# Patient Record
Sex: MALE | Race: WHITE | URBAN - METROPOLITAN AREA
[De-identification: names, ages, dates, MRNs, and addresses within clinical notes are randomized per-mention and may not be internally consistent; named-entity substitution may affect disease eponyms.]

---

## 2017-02-08 ENCOUNTER — TRANSFERRED RECORDS (OUTPATIENT)
Dept: HEALTH INFORMATION MANAGEMENT | Facility: CLINIC | Age: 62
End: 2017-02-08

## 2017-02-13 ENCOUNTER — TRANSFERRED RECORDS (OUTPATIENT)
Dept: HEALTH INFORMATION MANAGEMENT | Facility: CLINIC | Age: 62
End: 2017-02-13

## 2017-02-21 ENCOUNTER — PRE VISIT (OUTPATIENT)
Dept: OTOLARYNGOLOGY | Facility: CLINIC | Age: 62
End: 2017-02-21

## 2017-02-21 NOTE — TELEPHONE ENCOUNTER
ENT PRE VISIT NOTE    On the phone call:    Date of Call: February 20, 2017  Date of appointment: March 09, 2017  Reason for Call (Should match scheduling appointment note): Left frontal sinusitis suspected mucocele   Who made the initial call:  (patient, Parent (Name of Parent), referral source) Patient   Who is the Referring Provider? (Name, address, phone number, location of clinic) Dr. Oscar Preston   Where have you been seen for this condition? [facility]   Where and what testing has been done including: CT Scans: Sinus 2/8/17 & CT Head 2/22/16  ENT related surgeries in the past: Yes Chronic sinusitis 4/11/16       Request medical records: Yes  From where: [facility]   What date: 2/21/17  Who did you speak to: Fax   Is the information already in the EMR? No         Have films been pushed to PAC and when? yes:  date 2/21/17 CT sinus from [facility] available in [place] .     Have slides been sent to pathology and when?      Have outside records been received? Yes

## 2017-03-09 ENCOUNTER — OFFICE VISIT (OUTPATIENT)
Dept: OTOLARYNGOLOGY | Facility: CLINIC | Age: 62
End: 2017-03-09

## 2017-03-09 VITALS — BODY MASS INDEX: 26.2 KG/M2 | WEIGHT: 183 LBS | HEIGHT: 70 IN

## 2017-03-09 DIAGNOSIS — J32.4 CHRONIC PANSINUSITIS: Primary | ICD-10-CM

## 2017-03-09 RX ORDER — FLUTICASONE PROPIONATE 50 MCG
2 SPRAY, SUSPENSION (ML) NASAL
COMMUNITY
Start: 2012-01-25

## 2017-03-09 ASSESSMENT — PAIN SCALES - GENERAL: PAINLEVEL: MODERATE PAIN (4)

## 2017-03-09 NOTE — PROGRESS NOTES
Otolaryngology Adult Consultation    Patient: Jose Antonio Dominguez   : 1955     Patient presents with:  Consult:   Chief Complaint   Patient presents with     Consult     New Left Sinus         Referring Provider: Constantine Preston   Consulting Physician:  Zenaida Mackay MD       Assessment/Plan: I reviewed Mr. Dominguez' films with him demonstrating the obstruction of the frontal recess bilaterally and some additional osteitis and mild sinus mucosal thickening.  I have recommended bilateral frontal sinusotomy and limited work on any of his other sinuses that demonstrate obstruction on his imaging.  The patient would like to proceed and will let me know when he has travel arrangements made.  We   will get him set up for preop history and physical and endoscopic revision sinus surgery with Draf III and additional minimal ethmoid and maxillary sinus work.          HPI: Jose Antonio Dominguez is a 61 year old male seen today in the Otolaryngology Clinic for ongoing chronic rhinosinusitis symptoms after four previous sinus surgeries.  He has been working with Dr. Preston on his symptoms.  He was recently treated with doxycycline.  A recent CT scan demonstrates ongoing obstruction of his frontal recesses bilaterally, left greater than right.  His pain is worse left greater than right.  The patient has a long history of sinus disease and feels like he has difficulty getting secretions out of his nose.  He has been on multiple rounds of antibiotics in the past, and his disease has required fairly aggressive surgery.  Despite a previous frontal sinusotomy and medical therapy, his recent CT scan demonstrates ongoing obstruction with possible mucocele on the left.  He has multiple other health complaints.  We tried to focus today mostly on his frontal sinus disease which is causing him primarily pain and intermittent severe drainage.           Current Outpatient Prescriptions on File Prior to Visit:  Pregabalin (LYRICA PO) Take 100 mg  by mouth.     carBAMazepine (CARBATROL) 100 MG 12 hr capsule Take 100 mg by mouth 3 times daily.     HYDROcodone-acetaminophen (VICODIN) 5-500 MG per tablet Take 1-2 tablets by mouth every 6 hours as needed.     SIMVASTATIN PO Take 20 mg by mouth At Bedtime.     sodium chloride (OCEAN NASAL SPRAY) 0.65 % nasal spray Spray 2 sprays in nostril 6 times daily. (Patient not taking: Reported on 3/9/2017)     No current facility-administered medications on file prior to visit.        Allergies   Allergen Reactions     Augmentin Hives       Past Medical History   Diagnosis Date     Depressive disorder      Displacement of lumbar intervertebral disc without myelopathy 9/4/2008     Lumbosacral spondylosis without myelopathy 6/17/2008     Lumbosacral spondylosis without myelopathy 9/4/2008     Nasal polyps      Nonallopathic lesion of lumbar region, not elsewhere classified 6/17/2008     Nonallopathic lesion of lumbar region, not elsewhere classified 6/17/2008     Peripheral neuropathy (H)      Reduced vision          Review of Systems  [facility] ROS 3/9/2017   Constitutional Weight loss   Neurology Dizzy spells, Headache   Psychology Frequently feeling depressed or sad   Eyes Visual loss   Ears, Nose, Throat Ringing/noise in ears, Nasal congestion or drainage, Sore throat   Cardiopulmonary Breathing problems, Wheezing   Musculoskeletal Sore or stiff joints, Back pain        14 point ROS neg other than the symptoms noted above.    Physical Exam:    General Assessment   The patient is alert, oriented and in no acute distress.   Head/Face/Scalp  Grossly normal.   Ears  Normal canals, auricles and tympanic membranes.   Nose  External nose is straight, skin is normal. Intranasal exam (anterior rhinoscopy) reveals normal moist mucosa, turbinate tissue without edema, erythema or crusting.  Septum mainly in the midline.  No purulence seen, post surgical changes evident.  Mouth  Oral cavity shows healthy mucosa with out ulceration,  masses or other lesions involving the tongue, palate, buccal mucosa, floor of mouth or gingiva.    Oropharynx with normal tonsils, posterior wall and base of tongue.  Neck  No significant adenopathy, thyroid or salivary gland abnormality.      SNOT20:  Average and Sum Patient-Supplied Answers for the SNOT-20 3/9/2017   Total SNOT-20 Score (Average) 2.86   SNOT Score: Sum of responses 56       Imaging:    Reviewed outside CT.  Demonstrates osteitis and mucosal thickening throughout, and bilateral partial opacification of frontal sinuses L>R.  Obstruction of frontal recess due to soft tissue and bone.

## 2017-03-09 NOTE — LETTER
3/9/2017       RE: Jose Antonio Dominguez  [address] [address]     Dear Colleague,    Thank you for referring your patient, Jose Antonio Dominguez, to the [facility] EAR NOSE AND THROAT at [facility]. Please see a copy of my visit note below.      Otolaryngology Adult Consultation    Patient: Jose Antonio Dominguez   : 1955     Patient presents with:  Consult:   Chief Complaint   Patient presents with     Consult     New Left Sinus         Referring Provider: Constantine Preston   Consulting Physician:  Zenaida Mackay MD       Assessment/Plan: I reviewed Mr. Dominguez' films with him demonstrating the obstruction of the frontal recess bilaterally and some additional osteitis and mild sinus mucosal thickening.  I have recommended bilateral frontal sinusotomy and limited work on any of his other sinuses that demonstrate obstruction on his imaging.  The patient would like to proceed and will let me know when he has travel arrangements made.  We   will get him set up for preop history and physical and endoscopic revision sinus surgery with Draf III and additional minimal ethmoid and maxillary sinus work.          HPI: Jose Antonio Dominguez is a 61 year old male seen today in the Otolaryngology Clinic for ongoing chronic rhinosinusitis symptoms after four previous sinus surgeries.  He has been working with Dr. Preston on his symptoms.  He was recently treated with doxycycline.  A recent CT scan demonstrates ongoing obstruction of his frontal recesses bilaterally, left greater than right.  His pain is worse left greater than right.  The patient has a long history of sinus disease and feels like he has difficulty getting secretions out of his nose.  He has been on multiple rounds of antibiotics in the past, and his disease has required fairly aggressive surgery.  Despite a previous frontal sinusotomy and medical therapy, his recent CT scan demonstrates ongoing obstruction with possible mucocele  on the left.  He has multiple other health complaints.  We tried to focus today mostly on his frontal sinus disease which is causing him primarily pain and intermittent severe drainage.           Current Outpatient Prescriptions on File Prior to Visit:  Pregabalin (LYRICA PO) Take 100 mg by mouth.     carBAMazepine (CARBATROL) 100 MG 12 hr capsule Take 100 mg by mouth 3 times daily.     HYDROcodone-acetaminophen (VICODIN) 5-500 MG per tablet Take 1-2 tablets by mouth every 6 hours as needed.     SIMVASTATIN PO Take 20 mg by mouth At Bedtime.     sodium chloride (OCEAN NASAL SPRAY) 0.65 % nasal spray Spray 2 sprays in nostril 6 times daily. (Patient not taking: Reported on 3/9/2017)     No current facility-administered medications on file prior to visit.        Allergies   Allergen Reactions     Augmentin Hives       Past Medical History   Diagnosis Date     Depressive disorder      Displacement of lumbar intervertebral disc without myelopathy 9/4/2008     Lumbosacral spondylosis without myelopathy 6/17/2008     Lumbosacral spondylosis without myelopathy 9/4/2008     Nasal polyps      Nonallopathic lesion of lumbar region, not elsewhere classified 6/17/2008     Nonallopathic lesion of lumbar region, not elsewhere classified 6/17/2008     Peripheral neuropathy (H)      Reduced vision          Review of Systems   ENT ROS 3/9/2017   Constitutional Weight loss   Neurology Dizzy spells, Headache   Psychology Frequently feeling depressed or sad   Eyes Visual loss   Ears, Nose, Throat Ringing/noise in ears, Nasal congestion or drainage, Sore throat   Cardiopulmonary Breathing problems, Wheezing   Musculoskeletal Sore or stiff joints, Back pain        14 point ROS neg other than the symptoms noted above.    Physical Exam:    General Assessment   The patient is alert, oriented and in no acute distress.   Head/Face/Scalp  Grossly normal.   Ears  Normal canals, auricles and tympanic membranes.   Nose  External nose is  straight, skin is normal. Intranasal exam (anterior rhinoscopy) reveals normal moist mucosa, turbinate tissue without edema, erythema or crusting.  Septum mainly in the midline.  No purulence seen, post surgical changes evident.  Mouth  Oral cavity shows healthy mucosa with out ulceration, masses or other lesions involving the tongue, palate, buccal mucosa, floor of mouth or gingiva.    Oropharynx with normal tonsils, posterior wall and base of tongue.  Neck  No significant adenopathy, thyroid or salivary gland abnormality.      SNOT20:  Average and Sum Patient-Supplied Answers for the SNOT-20 3/9/2017   Total SNOT-20 Score (Average) 2.86   SNOT Score: Sum of responses 56       Imaging:    Reviewed outside CT.  Demonstrates osteitis and mucosal thickening throughout, and bilateral partial opacification of frontal sinuses L>R.  Obstruction of frontal recess due to soft tissue and bone.        Again, thank you for allowing me to participate in the care of your patient.      Sincerely,    Zenaida Mackay MD

## 2017-03-09 NOTE — MR AVS SNAPSHOT
After Visit Summary   3/9/2017    Jose Antonio Dominguez    MRN: [id number]           Patient Information     Date Of Birth          1955        Visit Information        Provider Department      3/9/2017 10:00 AM Zenaida Mackay MD [facility] Ear Nose and Throat        Today's Diagnoses     Chronic pansinusitis    -  1      Care Instructions    Plan of care:  Once you have worked out when you want to do the surgery, call Laine Mackay's surgeries are on  and two /month (2nd and 4th)  Clinic contact information:  1. To schedule an appointment call [phone], option 1  2. To talk to the Triage RN call [phone], option 3  3. If you need to speak to Laine or get a message to your doctor on a Friday, call the triage RN  4. LaineRN: [phone]  5. Surgery scheduling:      Luz Elena Collins: [phone]      Esperanza Poole: [phone]  6. Fax: [phone]  7. [phone]      Pre-op reminders:  1. Complete pre-op H+P within 30 days of surgery (recommend pre-op appointment 2 weeks prior to surgery date).   2.  needed on day of surgery.   3.Discuss all medications, including blood-thinning medications with PCP at pre-op appointment (Coumadin, Plavix, Aspirin, Ibuprofen/Motrin, Vitamin E and Fish Oil), which may need to be discontinued 7 days prior to surgery date.   4. Nothing to eat or drink after midnight the night before surgery.   5. Take shower or bath the morning of surgery and remove deodorant, cologne, scented lotion, makeup, nail polish and jewelry.           Follow-ups after your visit        Who to contact     Please call your clinic at [phone] to:    Ask questions about your health    Make or cancel appointments    Discuss your medicines    Learn about your test results    Speak to your doctor   If you have compliments or concerns about an experience at your clinic, or if you wish to file a complaint, please contact [facility] Patient  "Relations at [phone] or email us at [email or web address]         Additional Information About Your Visit        PlasmaSi Information     PlasmaSi is an electronic gateway that provides easy, online access to your medical records. With PlasmaSi, you can request a clinic appointment, read your test results, renew a prescription or communicate with your care team.     To sign up for PlasmaSi visit the website at [email or web address]   You will be asked to enter the access code listed below, as well as some personal information. Please follow the directions to create your username and password.     Your access code is: [email or web address]  Expires: 2017  3:38 PM     Your access code will  in 90 days. If you need help or a new code, please contact your [facility] or call [phone] for assistance.        Care EveryWhere ID     This is your Care EveryWhere ID. This could be used by other organizations to access your [place] medical records  [id number]        Your Vitals Were     Height BMI (Body Mass Index)                1.78 m (5' 10.08\") 26.2 kg/m2           Blood Pressure from Last 3 Encounters:   12 111/71    Weight from Last 3 Encounters:   17 83 kg (183 lb)   12 88.9 kg (196 lb)              We Performed the Following     Li-Operative Worksheet (ENT Adult Default Surgery Request)        Primary Care Provider Office Phone # Fax #    Orlin Adams [phone]       [facility] [address] [place] [address]        Thank you!     Thank you for choosing [facility] EAR NOSE AND THROAT  for your care. Our goal is always to provide you with excellent care. Hearing back from our patients is one way we can continue to improve our services. Please take a few minutes to complete the written survey that you may receive in the mail after your visit with us. Thank you!             Your Updated Medication " List - Protect others around you: Learn how to safely use, store and throw away your medicines at www.disposemymeds.org.          This list is accurate as of: 3/9/17 10:38 AM.  Always use your most recent med list.                   Brand Name Dispense Instructions for use    calcium-vitamin D 600-400 MG-UNIT per tablet    CALTRATE         carBAMazepine 100 MG 12 hr capsule    CARBATROL     Take 100 mg by mouth 3 times daily.       fluticasone 50 MCG/ACT spray    FLONASE     2 sprays       HYDROcodone-acetaminophen 5-500 MG per tablet    VICODIN     Take 1-2 tablets by mouth every 6 hours as needed.       LYRICA PO      Take 100 mg by mouth.       SIMVASTATIN PO      Take 20 mg by mouth At Bedtime.       sodium chloride 0.65 % nasal spray    OCEAN NASAL SPRAY    1 Bottle    Spray 2 sprays in nostril 6 times daily.

## 2017-03-09 NOTE — PATIENT INSTRUCTIONS
Plan of care:  Once you have worked out when you want to do the surgery, call Laine Mackay's surgeries are on  and two /month (2nd and 4th)  Clinic contact information:  1. To schedule an appointment call [phone], option 1  2. To talk to the Triage RN call [phone], option 3  3. If you need to speak to Laine or get a message to your doctor on a Friday, call the triage RN  4. LaineRN: [phone]  5. Surgery scheduling:      Luz Elena Collins: [phone]      Esperanza Poole: [phone]  6. Fax: [phone]  7. [phone]      Pre-op reminders:  1. Complete pre-op H+P within 30 days of surgery (recommend pre-op appointment 2 weeks prior to surgery date).   2.  needed on day of surgery.   3.Discuss all medications, including blood-thinning medications with PCP at pre-op appointment (Coumadin, Plavix, Aspirin, Ibuprofen/Motrin, Vitamin E and Fish Oil), which may need to be discontinued 7 days prior to surgery date.   4. Nothing to eat or drink after midnight the night before surgery.   5. Take shower or bath the morning of surgery and remove deodorant, cologne, scented lotion, makeup, nail polish and jewelry.

## 2017-05-18 ENCOUNTER — APPOINTMENT (OUTPATIENT)
Dept: SURGERY | Facility: CLINIC | Age: 62
End: 2017-05-18

## 2017-05-22 ENCOUNTER — HOSPITAL ENCOUNTER (OUTPATIENT)
Facility: AMBULATORY SURGERY CENTER | Age: 62
End: 2017-05-22
Attending: OTOLARYNGOLOGY

## 2017-05-22 ENCOUNTER — TELEPHONE (OUTPATIENT)
Dept: NURSING | Facility: CLINIC | Age: 62
End: 2017-05-22

## 2017-05-22 ENCOUNTER — ANESTHESIA EVENT (OUTPATIENT)
Dept: SURGERY | Facility: AMBULATORY SURGERY CENTER | Age: 62
End: 2017-05-22

## 2017-05-22 ENCOUNTER — SURGERY (OUTPATIENT)
Age: 62
End: 2017-05-22

## 2017-05-22 ENCOUNTER — ANESTHESIA (OUTPATIENT)
Dept: SURGERY | Facility: AMBULATORY SURGERY CENTER | Age: 62
End: 2017-05-22

## 2017-05-22 VITALS
OXYGEN SATURATION: 96 % | HEIGHT: 72 IN | DIASTOLIC BLOOD PRESSURE: 71 MMHG | SYSTOLIC BLOOD PRESSURE: 113 MMHG | WEIGHT: 183 LBS | RESPIRATION RATE: 14 BRPM | BODY MASS INDEX: 24.79 KG/M2 | TEMPERATURE: 97 F

## 2017-05-22 DIAGNOSIS — Z98.890 S/P NASAL SURGERY: Primary | ICD-10-CM

## 2017-05-22 PROCEDURE — 88304 TISSUE EXAM BY PATHOLOGIST: CPT | Performed by: OTOLARYNGOLOGY

## 2017-05-22 PROCEDURE — 88305 TISSUE EXAM BY PATHOLOGIST: CPT | Performed by: OTOLARYNGOLOGY

## 2017-05-22 RX ORDER — LIDOCAINE HYDROCHLORIDE 20 MG/ML
INJECTION, SOLUTION INFILTRATION; PERINEURAL PRN
Status: DISCONTINUED | OUTPATIENT
Start: 2017-05-22 | End: 2017-05-22

## 2017-05-22 RX ORDER — ACETAMINOPHEN 325 MG/1
975 TABLET ORAL ONCE
Status: COMPLETED | OUTPATIENT
Start: 2017-05-22 | End: 2017-05-22

## 2017-05-22 RX ORDER — OXYMETAZOLINE HYDROCHLORIDE 0.05 G/100ML
SPRAY NASAL PRN
Status: DISCONTINUED | OUTPATIENT
Start: 2017-05-22 | End: 2017-05-22 | Stop reason: HOSPADM

## 2017-05-22 RX ORDER — PROPOFOL 10 MG/ML
INJECTION, EMULSION INTRAVENOUS PRN
Status: DISCONTINUED | OUTPATIENT
Start: 2017-05-22 | End: 2017-05-22

## 2017-05-22 RX ORDER — DEXAMETHASONE SODIUM PHOSPHATE 4 MG/ML
INJECTION, SOLUTION INTRA-ARTICULAR; INTRALESIONAL; INTRAMUSCULAR; INTRAVENOUS; SOFT TISSUE PRN
Status: DISCONTINUED | OUTPATIENT
Start: 2017-05-22 | End: 2017-05-22

## 2017-05-22 RX ORDER — ONDANSETRON 2 MG/ML
INJECTION INTRAMUSCULAR; INTRAVENOUS PRN
Status: DISCONTINUED | OUTPATIENT
Start: 2017-05-22 | End: 2017-05-22

## 2017-05-22 RX ORDER — SODIUM CHLORIDE, SODIUM LACTATE, POTASSIUM CHLORIDE, CALCIUM CHLORIDE 600; 310; 30; 20 MG/100ML; MG/100ML; MG/100ML; MG/100ML
INJECTION, SOLUTION INTRAVENOUS CONTINUOUS
Status: DISCONTINUED | OUTPATIENT
Start: 2017-05-22 | End: 2017-05-23 | Stop reason: HOSPADM

## 2017-05-22 RX ORDER — FENTANYL CITRATE 50 UG/ML
25-50 INJECTION, SOLUTION INTRAMUSCULAR; INTRAVENOUS
Status: DISCONTINUED | OUTPATIENT
Start: 2017-05-22 | End: 2017-05-22 | Stop reason: HOSPADM

## 2017-05-22 RX ORDER — GABAPENTIN 300 MG/1
300 CAPSULE ORAL ONCE
Status: DISCONTINUED | OUTPATIENT
Start: 2017-05-22 | End: 2017-05-22 | Stop reason: HOSPADM

## 2017-05-22 RX ORDER — PROPOFOL 10 MG/ML
INJECTION, EMULSION INTRAVENOUS CONTINUOUS PRN
Status: DISCONTINUED | OUTPATIENT
Start: 2017-05-22 | End: 2017-05-22

## 2017-05-22 RX ORDER — OXYMETAZOLINE HYDROCHLORIDE 0.05 G/100ML
2 SPRAY NASAL
Status: COMPLETED | OUTPATIENT
Start: 2017-05-22 | End: 2017-05-22

## 2017-05-22 RX ORDER — LIDOCAINE HYDROCHLORIDE AND EPINEPHRINE 10; 10 MG/ML; UG/ML
INJECTION, SOLUTION INFILTRATION; PERINEURAL PRN
Status: DISCONTINUED | OUTPATIENT
Start: 2017-05-22 | End: 2017-05-22 | Stop reason: HOSPADM

## 2017-05-22 RX ORDER — NALOXONE HYDROCHLORIDE 0.4 MG/ML
.1-.4 INJECTION, SOLUTION INTRAMUSCULAR; INTRAVENOUS; SUBCUTANEOUS
Status: DISCONTINUED | OUTPATIENT
Start: 2017-05-22 | End: 2017-05-23 | Stop reason: HOSPADM

## 2017-05-22 RX ORDER — EPHEDRINE SULFATE 50 MG/ML
INJECTION, SOLUTION INTRAMUSCULAR; INTRAVENOUS; SUBCUTANEOUS PRN
Status: DISCONTINUED | OUTPATIENT
Start: 2017-05-22 | End: 2017-05-22

## 2017-05-22 RX ORDER — ONDANSETRON 2 MG/ML
4 INJECTION INTRAMUSCULAR; INTRAVENOUS EVERY 30 MIN PRN
Status: DISCONTINUED | OUTPATIENT
Start: 2017-05-22 | End: 2017-05-23 | Stop reason: HOSPADM

## 2017-05-22 RX ORDER — OXYMETAZOLINE HYDROCHLORIDE 0.05 G/100ML
2-3 SPRAY NASAL PRN
Qty: 1 BOTTLE | Refills: 0 | Status: SHIPPED | OUTPATIENT
Start: 2017-05-22 | End: 2017-05-31

## 2017-05-22 RX ORDER — LIDOCAINE 40 MG/G
CREAM TOPICAL
Status: DISCONTINUED | OUTPATIENT
Start: 2017-05-22 | End: 2017-05-22 | Stop reason: HOSPADM

## 2017-05-22 RX ORDER — HYDROCODONE BITARTRATE AND ACETAMINOPHEN 5; 325 MG/1; MG/1
1-2 TABLET ORAL EVERY 4 HOURS PRN
Qty: 30 TABLET | Refills: 0 | Status: SHIPPED | OUTPATIENT
Start: 2017-05-22 | End: 2017-08-23

## 2017-05-22 RX ORDER — ECHINACEA PURPUREA EXTRACT 125 MG
2 TABLET ORAL 4 TIMES DAILY
Qty: 12 ML | Refills: 0 | Status: SHIPPED | OUTPATIENT
Start: 2017-05-22 | End: 2017-06-21

## 2017-05-22 RX ORDER — ONDANSETRON 4 MG/1
4 TABLET, ORALLY DISINTEGRATING ORAL EVERY 30 MIN PRN
Status: DISCONTINUED | OUTPATIENT
Start: 2017-05-22 | End: 2017-05-23 | Stop reason: HOSPADM

## 2017-05-22 RX ORDER — GLYCOPYRROLATE 0.2 MG/ML
INJECTION, SOLUTION INTRAMUSCULAR; INTRAVENOUS PRN
Status: DISCONTINUED | OUTPATIENT
Start: 2017-05-22 | End: 2017-05-22

## 2017-05-22 RX ORDER — SODIUM CHLORIDE, SODIUM LACTATE, POTASSIUM CHLORIDE, CALCIUM CHLORIDE 600; 310; 30; 20 MG/100ML; MG/100ML; MG/100ML; MG/100ML
INJECTION, SOLUTION INTRAVENOUS CONTINUOUS
Status: DISCONTINUED | OUTPATIENT
Start: 2017-05-22 | End: 2017-05-22 | Stop reason: HOSPADM

## 2017-05-22 RX ADMIN — Medication 0.5 MG: at 11:18

## 2017-05-22 RX ADMIN — PROPOFOL 100 MCG/KG/MIN: 10 INJECTION, EMULSION INTRAVENOUS at 09:00

## 2017-05-22 RX ADMIN — ACETAMINOPHEN 975 MG: 325 TABLET ORAL at 08:32

## 2017-05-22 RX ADMIN — SODIUM CHLORIDE, SODIUM LACTATE, POTASSIUM CHLORIDE, CALCIUM CHLORIDE: 600; 310; 30; 20 INJECTION, SOLUTION INTRAVENOUS at 08:31

## 2017-05-22 RX ADMIN — EPHEDRINE SULFATE 5 MG: 50 INJECTION, SOLUTION INTRAMUSCULAR; INTRAVENOUS; SUBCUTANEOUS at 09:31

## 2017-05-22 RX ADMIN — OXYMETAZOLINE HYDROCHLORIDE 2 SPRAY: 0.05 SPRAY NASAL at 08:31

## 2017-05-22 RX ADMIN — Medication 0.25 MG: at 09:40

## 2017-05-22 RX ADMIN — Medication 0.25 MG: at 09:47

## 2017-05-22 RX ADMIN — SODIUM CHLORIDE, SODIUM LACTATE, POTASSIUM CHLORIDE, CALCIUM CHLORIDE: 600; 310; 30; 20 INJECTION, SOLUTION INTRAVENOUS at 09:00

## 2017-05-22 RX ADMIN — DEXAMETHASONE SODIUM PHOSPHATE 4 MG: 4 INJECTION, SOLUTION INTRA-ARTICULAR; INTRALESIONAL; INTRAMUSCULAR; INTRAVENOUS; SOFT TISSUE at 09:10

## 2017-05-22 RX ADMIN — LIDOCAINE HYDROCHLORIDE 80 MG: 20 INJECTION, SOLUTION INFILTRATION; PERINEURAL at 09:06

## 2017-05-22 RX ADMIN — EPHEDRINE SULFATE 5 MG: 50 INJECTION, SOLUTION INTRAMUSCULAR; INTRAVENOUS; SUBCUTANEOUS at 10:04

## 2017-05-22 RX ADMIN — EPHEDRINE SULFATE 10 MG: 50 INJECTION, SOLUTION INTRAMUSCULAR; INTRAVENOUS; SUBCUTANEOUS at 09:16

## 2017-05-22 RX ADMIN — GLYCOPYRROLATE 0.2 MG: 0.2 INJECTION, SOLUTION INTRAMUSCULAR; INTRAVENOUS at 10:40

## 2017-05-22 RX ADMIN — OXYMETAZOLINE HYDROCHLORIDE 30 ML: 0.05 SPRAY NASAL at 09:10

## 2017-05-22 RX ADMIN — Medication 50 MG: at 09:07

## 2017-05-22 RX ADMIN — ONDANSETRON 4 MG: 2 INJECTION INTRAMUSCULAR; INTRAVENOUS at 10:29

## 2017-05-22 RX ADMIN — LIDOCAINE HYDROCHLORIDE AND EPINEPHRINE 5 ML: 10; 10 INJECTION, SOLUTION INFILTRATION; PERINEURAL at 09:20

## 2017-05-22 RX ADMIN — PROPOFOL 150 MG: 10 INJECTION, EMULSION INTRAVENOUS at 09:06

## 2017-05-22 NOTE — OR NURSING
"Patient stated friend Amarilis was \"dropping (him) off in [place] then heading to [place]\". RN verbalized importance that a responsible adult stays with him for 24 hours, otherwise patient will be admitted. Amarilis stated she would stay with him for 24 hours at all times.  Rola Fonseca RN BSN CPN  5/22/2017 12:32 PM      "

## 2017-05-22 NOTE — ANESTHESIA CARE TRANSFER NOTE
Patient: Jose Antonio Dominguez    Procedure(s):  Stealth Guided Bilateral Frontal Sinusotomy, Ethmoidotomy and Maxillary Sinusotomy  - Wound Class: II-Clean Contaminated    Diagnosis: Sinusitis  Diagnosis Additional Information: No value filed.    Anesthesia Type:   General     Note:  Airway :Face Mask  Patient transferred to:PACU  Comments: Patient awake and breathing spont.VSS. Trasnsported on O2. No complaints of nausea. Given  0.5mg Dilaudid upon arrival to PACU. Report to RN.      Vitals: (Last set prior to Anesthesia Care Transfer)    CRNA VITALS  5/22/2017 1042 - 5/22/2017 1118      5/22/2017             Resp Rate (observed): 10                Electronically Signed By: CODEY Awad CRNA  May 22, 2017  11:18 AM

## 2017-05-22 NOTE — IP AVS SNAPSHOT
MRN:[id number]                      After Visit Summary   5/22/2017    Jose Antonio Dominguez    MRN: [id number]           Thank you!     Thank you for choosing [facility] for your care. Our goal is always to provide you with excellent care. Hearing back from our patients is one way we can continue to improve our services. Please take a few minutes to complete the written survey that you may receive in the mail after you visit with us. Thank you!        Patient Information     Date Of Birth          1955        About your hospital stay     You were admitted on:  May 22, 2017 [facility]    You were discharged on:  May 22, 2017       Who to Call     For medical emergencies, please call 911.  For non-urgent questions about your medical care, please call your primary care provider or clinic, [phone]  For questions related to your surgery, please call your surgery clinic        Attending Provider     Provider Zenaida Vines MD Otolaryngology       Primary Care Provider Office Phone # Fax #    Orlin Adams [phone]       [facility] [address] [place] [address]        After Care Instructions     Diet Instructions       Resume pre procedure diet            Discharge Instructions       Patient to follow up with surgeon Dr. Zenaida Mackay on 6/1/17 as previously scheduled.            Discharge Instructions - Lifting restrictions       Lifting Restrictions 10 pounds until seen at Post-op follow up appointment            No Alcohol       For 24 hours following procedure            No Aspirin, Ibuprofen or Naproxen products       for 7 - 10 days following surgery            No blowing nose           No driving or operating machinery       until the day after procedure                  Your next 10 appointments already scheduled     Jun 01, 2017  9:00 AM CDT   (Arrive by 8:45 AM)   Return  Visit with Zenaida Mackay MD   [facility] ([facility])    [address]  4th Floor  [facility] [address] [phone]              Further instructions from your care team       [facility]  Home Care Following Anesthesia  For 24 hours after surgery:  1. Get plenty of rest.  A responsible adult must stay with you for at least 24 hours after you leave the surgery center.  2. Do not drive or use heavy equipment.  If you have weakness or tingling, don't drive or use heavy equipment until this feeling goes away.   3. Do not drink alcohol.   4. Avoid strenuous or risky activities.  Ask for help when climbing stairs.  5. You may feel lightheaded.  IF so, sit for a few minutes before standing.  Have someone help you get up.   6. If you have nausea (feel sick to your stomach): Drink only clear liquids such as apple juice, ginger ale, broth or 7-Up.  Rest may also help.  Be sure to drink enough fluids.  Move to a regular diet as you feel able.   7. You may have a slight fever.  Call the doctor if your fever is over 100 F (37.7 C) (taken under the tongue) or lasts longer than 24 hours.  8. You may have a dry mouth, a sore throat, muscle aches or trouble sleeping. These should go away after 24 hours.  9. Do not make important or legal decisions.               Tips for taking pain medications  To get the best pain relief possible, remember these points:    Take pain medications as directed, before pain becomes severe.    Pain medication can upset your stomach: taking it with food may help.    Constipation is a common side effect of pain medication. Drink plenty of  fluids.    Eat foods high in fiber. Take a stool softener if recommended by your doctor or pharmacist.    Do not drink alcohol, drive or operate machinery while taking pain medications.    Ask about other ways to control pain, such as with heat, ice or relaxation.    Call a  doctor for any of the followin. Signs of infection (fever, growing tenderness at the surgery site, a large amount of drainage or bleeding, severe pain, foul-smelling drainage, redness, swelling).  2. It has been over 8 to 10 hours since surgery and you are still not able to urinate (pass water).  3. Headache for over 24 hours.  4. Numbness, tingling or weakness the day after surgery (if you had spinal anesthesia).  Your doctor is:  Dr. Zenaida Mackay, ENT Otolaryngology: [phone]                    Or dial [phone] and ask for the resident on call for:  ENT Otolaryngology  For emergency care, call the:  [facility]:  [phone] (TTY for hearing impaired: [phone])    Pending Results     No orders found from 2017 to 2017.            Admission Information     Date & Time Provider Department Dept. Phone    2017 Zenaida Mackay MD [facility] [phone]      Your Vitals Were     Blood Pressure Temperature Respirations Height Weight Pulse Oximetry    117/70 97.6  F (36.4  C) 14 1.829 m (6') 83 kg (183 lb) 95%    BMI (Body Mass Index)                   24.82 kg/m2           [employer] Information     [employer] is an electronic gateway that provides easy, online access to your medical records. With [employer], you can request a clinic appointment, read your test results, renew a prescription or communicate with your care team.     To sign up for [employer] visit the website at [email or web address]   You will be asked to enter the access code listed below, as well as some personal information. Please follow the directions to create your username and password.     Your access code is: [email or web address]  Expires: 2017 11:39 AM     Your access code will  in 90 days. If you need help or a new code, please contact your [facility] or call [phone] for assistance.        Care EveryWhere ID     This is your Care  EveryWhere ID. This could be used by other organizations to access your [facility] medical records  [id number]           Review of your medicines      START taking        Dose / Directions    HYDROcodone-acetaminophen 5-325 MG per tablet   Commonly known as:  NORCO   Used for:  S/P nasal surgery   Replaces:  HYDROcodone-acetaminophen 5-500 MG per tablet        Dose:  1-2 tablet   Take 1-2 tablets by mouth every 4 hours as needed for moderate to severe pain   Quantity:  30 tablet   Refills:  0       oxymetazoline 0.05 % spray   Commonly known as:  AFRIN NASAL SPRAY   Used for:  S/P nasal surgery        Dose:  2-3 spray   Spray 2-3 sprays into both nostrils as needed for other (nasal bleeding)   Quantity:  1 Bottle   Refills:  0         CONTINUE these medicines which may have CHANGED, or have new prescriptions. If we are uncertain of the size of tablets/capsules you have at home, strength may be listed as something that might have changed.        Dose / Directions    sodium chloride 0.65 % nasal spray   Commonly known as:  OCEAN   This may have changed:  when to take this   Used for:  S/P nasal surgery        Dose:  2 spray   Spray 2 sprays in nostril 4 times daily   Quantity:  12 mL   Refills:  0         CONTINUE these medicines which have NOT CHANGED        Dose / Directions    calcium-vitamin D 600-400 MG-UNIT per tablet   Commonly known as:  CALTRATE        Refills:  0       carBAMazepine 100 MG 12 hr capsule   Commonly known as:  CARBATROL        Dose:  100 mg   Take 100 mg by mouth 3 times daily.   Refills:  0       fluticasone 50 MCG/ACT spray   Commonly known as:  FLONASE        Dose:  2 spray   2 sprays   Refills:  0       LYRICA PO        Dose:  100 mg   Take 100 mg by mouth.   Refills:  0       SIMVASTATIN PO        Dose:  20 mg   Take 20 mg by mouth At Bedtime.   Refills:  0         STOP taking     HYDROcodone-acetaminophen 5-500 MG per tablet   Commonly known as:  VICODIN   Replaced by:   HYDROcodone-acetaminophen 5-325 MG per tablet                Where to get your medicines      These medications were sent to [place], [place] - [address]  [address], [facility] [address]    Hours:  TRANSPLANT PHONE NUMBER [phone] Phone:  [phone]     oxymetazoline 0.05 % spray    sodium chloride 0.65 % nasal spray         Some of these will need a paper prescription and others can be bought over the counter. Ask your nurse if you have questions.     Bring a paper prescription for each of these medications     HYDROcodone-acetaminophen 5-325 MG per tablet                Protect others around you: Learn how to safely use, store and throw away your medicines at www.disposemymeds.org.             Medication List: This is a list of all your medications and when to take them. Check marks below indicate your daily home schedule. Keep this list as a reference.      Medications           Morning Afternoon Evening Bedtime As Needed    calcium-vitamin D 600-400 MG-UNIT per tablet   Commonly known as:  CALTRATE                                carBAMazepine 100 MG 12 hr capsule   Commonly known as:  CARBATROL   Take 100 mg by mouth 3 times daily.                                fluticasone 50 MCG/ACT spray   Commonly known as:  FLONASE   2 sprays                                HYDROcodone-acetaminophen 5-325 MG per tablet   Commonly known as:  NORCO   Take 1-2 tablets by mouth every 4 hours as needed for moderate to severe pain                                LYRICA PO   Take 100 mg by mouth.                                oxymetazoline 0.05 % spray   Commonly known as:  AFRIN NASAL SPRAY   Spray 2-3 sprays into both nostrils as needed for other (nasal bleeding)   Last time this was given:  30 mLs on 5/22/2017  9:10 AM                                SIMVASTATIN PO   Take 20 mg by mouth At Bedtime.                                sodium chloride 0.65 %  nasal spray   Commonly known as:  OCEAN   Spray 2 sprays in nostril 4 times daily

## 2017-05-22 NOTE — DISCHARGE INSTRUCTIONS
[facility]  Home Care Following Anesthesia  For 24 hours after surgery:  1. Get plenty of rest.  A responsible adult must stay with you for at least 24 hours after you leave the surgery center.  2. Do not drive or use heavy equipment.  If you have weakness or tingling, don't drive or use heavy equipment until this feeling goes away.   3. Do not drink alcohol.   4. Avoid strenuous or risky activities.  Ask for help when climbing stairs.  5. You may feel lightheaded.  IF so, sit for a few minutes before standing.  Have someone help you get up.   6. If you have nausea (feel sick to your stomach): Drink only clear liquids such as apple juice, ginger ale, broth or 7-Up.  Rest may also help.  Be sure to drink enough fluids.  Move to a regular diet as you feel able.   7. You may have a slight fever.  Call the doctor if your fever is over 100 F (37.7 C) (taken under the tongue) or lasts longer than 24 hours.  8. You may have a dry mouth, a sore throat, muscle aches or trouble sleeping. These should go away after 24 hours.  9. Do not make important or legal decisions.               Tips for taking pain medications  To get the best pain relief possible, remember these points:    Take pain medications as directed, before pain becomes severe.    Pain medication can upset your stomach: taking it with food may help.    Constipation is a common side effect of pain medication. Drink plenty of  fluids.    Eat foods high in fiber. Take a stool softener if recommended by your doctor or pharmacist.    Do not drink alcohol, drive or operate machinery while taking pain medications.    Ask about other ways to control pain, such as with heat, ice or relaxation.    Call a doctor for any of the followin. Signs of infection (fever, growing tenderness at the surgery site, a large amount of drainage or bleeding, severe pain, foul-smelling drainage, redness, swelling).  2. It has been over 8 to 10 hours  since surgery and you are still not able to urinate (pass water).  3. Headache for over 24 hours.  4. Numbness, tingling or weakness the day after surgery (if you had spinal anesthesia).  Your doctor is:  Dr. Zenaida Mackay, ENT Otolaryngology: [phone]                    Or dial [phone] and ask for the resident on call for:  ENT Otolaryngology  For emergency care, call the:  [facility]:  [phone] (TTY for hearing impaired: [phone])

## 2017-05-22 NOTE — ANESTHESIA PREPROCEDURE EVALUATION
Anesthesia Evaluation     . Pt has had prior anesthetic.            ROS/MED HX    ENT/Pulmonary: Comment: sinusitis      Neurologic: Comment: Chronic low back pain    (+)neuropathy     Cardiovascular:     (+) Dyslipidemia, ----. : . . . :. .       METS/Exercise Tolerance:     Hematologic:  - neg hematologic  ROS       Musculoskeletal:   (+) arthritis, , , -       GI/Hepatic:  - neg GI/hepatic ROS       Renal/Genitourinary:  - ROS Renal section negative       Endo:  - neg endo ROS       Psychiatric:     (+) psychiatric history depression      Infectious Disease:  - neg infectious disease ROS       Malignancy:      - no malignancy   Other:                     Physical Exam  Normal systems: dental    Airway   Mallampati: II  TM distance: >3 FB  Neck ROM: full    Dental     Cardiovascular   Rhythm and rate: regular      Pulmonary    breath sounds clear to auscultation                    Anesthesia Plan      History & Physical Review  History and physical reviewed and following examination; no interval change.    ASA Status:  2 .    NPO Status:  > 8 hours    Plan for General and ETT with Intravenous induction. Maintenance will be Balanced.    PONV prophylaxis:  Ondansetron (or other 5HT-3) and Dexamethasone or Solumedrol       Postoperative Care  Postoperative pain management:  Oral pain medications and Multi-modal analgesia.      Consents  Anesthetic plan, risks, benefits and alternatives discussed with:  Patient..                          .

## 2017-05-22 NOTE — BRIEF OP NOTE
[facility] Brief Operative Note    Pre-operative diagnosis: Sinusitis   Post-operative diagnosis * No post-op diagnosis entered *   Procedure: Procedure(s):  Stealth Guided Bilateral Frontal Sinusotomy, Ethmoidotomy and Maxillary Sinusotomy  - Wound Class: II-Clean Contaminated   Surgeon: Zenaida Mackay MD   Assistants(s): Percy Fan MD   Estimated blood loss: 20 mL    Specimens: Bilateral sinus contents, left buccal mucosa lesion   Findings: Bilateral revision frontal sinusotomy, bilateral revision ethmoidectomy, bilateral revision sphenoidotomies, excision of left buccal mucosa lesion.

## 2017-05-22 NOTE — ANESTHESIA POSTPROCEDURE EVALUATION
Patient: Jose Antonio Dominguez    Procedure(s):  Stealth Guided Bilateral Frontal Sinusotomy, Ethmoidotomy and Maxillary Sinusotomy  - Wound Class: II-Clean Contaminated    Diagnosis:Sinusitis  Diagnosis Additional Information: No value filed.    Anesthesia Type:  General    Note:  Anesthesia Post Evaluation    Patient location during evaluation: PACU  Patient participation: Able to fully participate in evaluation  Level of consciousness: awake and alert  Pain management: adequate  Airway patency: patent  Cardiovascular status: hemodynamically stable  Respiratory status: nonlabored ventilation, spontaneous ventilation and room air  Hydration status: euvolemic  PONV: none     Anesthetic complications: None          Last vitals:  Vitals:    05/22/17 1120 05/22/17 1130 05/22/17 1159   BP: 132/71 117/70 113/71   Resp: 14 14 14   Temp: 36.4  C (97.6  F) 36.4  C (97.6  F) 36.1  C (97  F)   SpO2: 95% 95% 96%         Electronically Signed By: Hubert Posadas MD  May 22, 2017  12:26 PM

## 2017-05-22 NOTE — TELEPHONE ENCOUNTER
"Call Type: Triage Call    Presenting Problem: Caller states that he had sinus surgery earlier  today and his mouth is very dry. Concerned he won't be able to sleep  tonight if it does not get better.  states he has had similaiar  surgery and did not feel this way. Advised that all procedures are  different and medications given each time may be different.  Reviewed  AVS  post op instructions with patient including the  advisement that this is normal \"You may have a dry mouth, a sore  throat, muscle aches or trouble sleeping. These should go away after  24 hours\"  Advised to  take ice chips, hard candy to keep mouth  moist.  Advised that  symptoms should gradually improve but  may  indeed have  some discomfort.  Call if symptoms persit > 24 hrs.  Triage Note:  Guideline Title: Postoperative Problems  Recommended Disposition: Provide Home/Self Care  Original Inclination:  Override Disposition:  Intended Action:  Physician Contacted: No  All other situations ?  YES  Mild headache ? NO  Pain in shoulder or upper back following laparoscopic procedure ? NO  Signs of dehydration ? NO  Unconscious now ? NO  Abdominal bloating ? NO  Vaginal spotting or discharge ? NO  New onset OR worsening bleeding from incision requiring pressure to control ? NO  Depression and no other symptoms ? NO  Dry cough OR scratchy throat AND less than 3 days post-op AND general anesthesia ?  NO  Severe breathing problems ? NO  Wound separation AND internal organs protrude through wound or surgical incision  (evisceration) ? NO  Hives /Urticaria /Rash ? NO  New or worsening signs and symptoms that may indicate shock ? NO  Neck lump/swelling ? NO  Coughing up large amount of obvious blood (not blood-streaked sputum) ? NO  Orthopedic hardware (metal plate, guillermo or screw) newly bulging under or through  skin ? NO  Surgery within the past 2 weeks AND abdominal, back, neck or shoulder discomfort ?  NO  Surgery within the past 2 weeks AND flatus \"gas\" or " feeling of bladder fullness ?  NO  Surgery within the past 2 weeks AND has questions ? NO  New seizure now or within last 6 hours ? NO  Continuous or heavy bleeding from operative site and NOT controlled with 10  minutes of steady pressure ? NO  Productive cough AND new onset green, yellow, brown or bloody sputum ? NO  Constipation AND has not improved with 48 hours of home care ? NO  Pain not relieved by pain medication when taken as directed ? NO  Passing red, black or tarry material from rectum AND onset of new signs and  symptoms of hypovolemia ? NO  Wearing cast or splint AND new or worsening pain, swelling, numbness, tingling,  coolness or change in color that is NOT improved by elevation for 30 minutes OR  not resolved within 2 hours ? NO  Temperature of 101.5 F (38.6 C) or greater that has not responded to 24 hours of  home care measures ? NO  Vomiting red, bloody or coffee-ground material, more than streaks of blood or  scant amount (not following nosebleed within past day) ? NO  New onset severe pain and pale, discolored or cool below the surgical site  compared to the other extremity ? NO  Current or recent urinary tract instrumentation AND urinary tract symptoms OR no  urine flow ? NO  Uncontrolled pain AND not taking pain medication as directed ? NO  New or worsening breathing problems ? NO  Heavy vaginal bleeding (soaking 1 pad/tampon every hour for 2 hours or more) ? NO  Urinary tract symptoms AND any flank or low back pain ? NO  Decreasing activity tolerance affecting ability to carry out activities of daily  living ? NO  Medical device (pump, infusion catheter) damaged or not functioning as expected  (leaking, not infusing, swelling at insertion site) ? NO  More bleeding from site of instrumentation or procedure OR bleeding lasting longer  than defined in provider specified discharge information ? NO  Persistent fatigue for more than 1 week after surgery ? NO  Has one or more urinary tract symptoms  AND has not been previously evaluated ? NO  Chest discomfort associated with shortness of breath, sweating, odd heartbeats or  different heart rate, nausea, vomiting, lightheadedness, or fainting lasting 5 or  more minutes now or within the last hour ? NO  Chest pain spreading to the shoulders, neck, jaw, in one or both arms, stomach or  back lasting 5 or more minutes now or within the last hour. Pain is NOT  associated with taking a deep breath or a productive cough, movement, or touch to  a localized area. ? NO  Recent onset of pain, tenderness or aching in an extremity that may worsen with  standing or walking OR a cord-like swelling in an extremity that may feel warm,  look red or discolored. ? NO  Signs and symptoms of anaphylaxis ? NO  Questions or concerns about postoperative wound ? NO  Headache following spinal anesthesia AND not relieved by pain medication ? NO  Pressure, fullness, squeezing sensation or pain anywhere in the chest lasting 5 or  more minutes now or within the last hour. Pain is NOT associated with taking a  deep breath or a productive cough, movement, or touch to a localized area on the  chest. ? NO  Evaluated by provider and has question/concern about their condition, treatment  plan, treatment options, follow-up appointments, or other follow-up care. ? NO  Persistent abdominal OR pelvic pain lasting longer than defined in provider  specified discharge information. ? NO  Persistent mild-moderate vaginal bleeding lasting longer than defined in provider  specified discharge information. ? NO  Sudden onset of focal neurological changes (difficulty speaking, numbness,  weakness, paralysis, loss of coordination, or change in vision such as double  vision or loss of visual field) ? NO  Sudden onset of shortness of breath, chest pain and cough with blood tinged sputum  ? NO  No urination for 12 or more hours ? NO  Abdominal pain, any blood in vomitus or stool, abdominal bloating, new fever  or  other cautionary symptoms began after GI surgery or procedure and is lasting  longer than defined in provider specified discharge information. ? NO  Abdominal pain, any blood in vomitus or stool, abdominal bloating, new fever or  other cautionary symptoms began after GI surgery or procedure and is lasting  longer than defined in provider specified discharge information. ? NO  New onset of unbearable pain within last 24 hours ? NO  New onset of appetite loss that has lasted 7 days or more ? NO  Wearing cast or splint AND new or worsening pain, swelling, numbness, tingling,  coolness or change in color ? NO  Any temperature elevation in an immunocompromised individual OR frail elderly with  signs of dehydration ? NO  Unable to retain food or fluids for 24 hours or more due to recurrent vomiting ? NO  Vomiting within 48 hours after the procedure and is not relieved by provider  recommended treatment ? NO  Any other unexpected urinary symptoms following urinary tract or abdominal surgery  within timeframe specified by provider ? NO  Physician Instructions:  Care Advice: Try to resume normal activities as soon as tolerated.  Follow-up with provider as scheduled.  CAUTIONS  Follow surgeon's suggestions for pain relief, incision care, diet, fluid  intake, and postop anesthesia care.  Call provider or surgeon for any other  questions and for clarification of postop instructions.  SYMPTOM / CONDITION MANAGEMENT  Total water intake includes drinking water, water in beverages, and water  contained in food. Fluids make up about 80% of the body's total hydration  need. Individual fluid requirement to maintain hydration vary based on  physical activity, environmental factors and illness. Limit fluids that  contain sugar, caffeine, or alcohol. Urine will be very light yellow color  when you drink enough fluids.

## 2017-05-22 NOTE — IP AVS SNAPSHOT
[facility]    [address] [place] [address]    Phone:  [phone]    Fax:  [phone]                                       After Visit Summary   5/22/2017    Jose Antonio Dominguez    MRN: [id number]           After Visit Summary Signature Page     I have received my discharge instructions, and my questions have been answered. I have discussed any challenges I see with this plan with the nurse or doctor.    ..........................................................................................................................................  Patient/Patient Representative Signature      ..........................................................................................................................................  Patient Representative Print Name and Relationship to Patient    ..................................................               ................................................  Date                                            Time    ..........................................................................................................................................  Reviewed by Signature/Title    ...................................................              ..............................................  Date                                                            Time

## 2017-05-23 LAB — COPATH REPORT: NORMAL

## 2017-05-23 NOTE — OP NOTE
PREOPERATIVE DIAGNOSIS:  Chronic rhinosinusitis.      POSTOPERATIVE DIAGNOSIS:  Chronic rhinosinusitis.      STAFF SURGEON:  Zenaida Mackay MD      RESIDENT SURGEON:  Percy Fan MD      PROCEDURES PERFORMED:   1.  Bilateral revision frontal sinusotomies under Stealth image guidance.   2.  Bilateral revision ethmoidectomies under Stealth image guidance.   3.  Bilateral revision sphenoidotomies under Stealth image guidance.   4.  Excision of left buccal mucosal lesion.      ANESTHESIA:  General.      COMPLICATIONS:  None.      ESTIMATED BLOOD LOSS:  20 mL      SPECIMENS:     1.  Bilateral sinus contents.     2.  Left buccal mucosal lesion.      CLINICAL INDICATIONS FOR PROCEDURE:  Mr. Jose Antonio Dominguez is a 61-year-old male with a history of chronic rhinosinusitis.  He has previously undergone 4 sinus surgeries.  He presented clinically with persistent left greater than right facial pain concentrated at his frontal sinus region as well as persistent nasal drainage.  He is refractory to multiple antibiotic therapies.  After risks, benefits, goals and alternatives were discussed with him, we elected to proceed with the above-mentioned procedure for revision sinus surgery with particular attention to opening his frontal sinus recesses bilaterally.      FINDINGS:  The patient had a minimal to moderate amount of ethmoid septations bilaterally.  These were taken down.  Revision sphenoidotomies were performed bilaterally in order to further open the sphenoid ostium, as there was cicatricial scarring circumferentially around the previous sphenoidotomies.  There was significant obstruction at the bilateral frontal recesses, including bony and soft tissue obstruction.  Drilling was necessary in order to perform our frontal sinusotomies, and significant polypoid inflamed mucosal tissue was also removed.  There was adequate opening of his frontal recesses bilaterally after the frontal sinusotomies were performed.  Additionally, there was  a left buccal mucosal lesion consistent with a mucocele versus a fibroma that was excised in its entirety and primarily closed.      DESCRIPTION OF PROCEDURE:  After the patient was identified in the preholding area and informed consent was obtained, he was brought to the operating room and placed on the table in supine position.  Anesthesia personnel achieved adequate sedation via general anesthesia and subsequently orotracheally intubated the patient without any issues.  The head of bed was turned 90 degrees in order to access the head and neck.  The Stealth image-guided system was then registered, calibrated and verified for accuracy.  Stealth was needed to address the complex frontal sinus anatomy.  Afrin-soaked pledgets were then placed into bilateral nasal cavities in order to achieve decongestion.  The patient was then draped in the usual sterile fashion.  A combination of 0 degree and 30 degree endoscopes was used for visualization throughout the procedure.  Bilateral nose was injected with 1% lidocaine with 1:100,000 of epinephrine in order to achieve vasoconstriction.  We began on the patient's right central nasal cavity.  There were a couple of bony septations within the ethmoid cavity that were taken down.  There was also a moderate amount of polypoid tissue within the middle meatus as well as emanating from the sphenoid ostium.  These were removed, and a polyp debridement was performed in his ethmoid cavity.  We were able to visualize the previous sphenoidotomy and were able to further debride polypoid tissue right at its entrance to further open it up.  Moving to the left side, ethmoid septations were taken down.  Kerrison forceps was used to widen the previous sphenoidotomy.      We then turned our attention to the frontal recesses.  Starting on the right frontal recess, we had to use a combination of frontal sinus forceps and the drill in order to remove very thick osteitic bone right at the frontal  recess.  Significant polypoid, inflamed mucosa tissue was also removed.  Once we were able to adequately enter into the frontal sinus and a wide frontal sinusotomy was performed, we were able to palpate a frontal sinus cell.  However, we were able to manipulate around it and felt that the frontal recess was adequately opened.  On the patient's left, the same procedure was performed, and a combination of frontal sinus forceps and the drill was used to remove osteitic bone right at the frontal recess.  There was significant polypoid tissue noted within the left frontal sinus, and a 90 degree suction shaver was used to remove a significant amount of polypoid tissue within the frontal sinus itself.  We were able to open the frontal recess widely with direct visualization into the frontal sinus.  The patient's bilateral sinonasal cavities were copiously irrigated and suctioned of blood and debris.  Rolled-up Gelfilm stents were placed at the frontal recesses bilaterally.      We then turned our attention to a lesion on the left buccal mucosa.  This was infiltrated with 1% lidocaine with 1:100,000 epinephrine.  A #15 blade was used to make an elliptical incision at the base within the mucosa of the lesion.  Using iris scissors, we were able to elevate the lesion in its entirety and remove a small cuff of buccal mucosa down to the level of the buccinator muscle.  This tissue was then passed off for further pathological review.  The buccal mucosa defect was then closed primarily with 4-0 Vicryl sutures in a simple interrupted fashion.  Hemostasis was achieved.  The ultimate elliptical defect was measured to be approximately 1 cm x 1.5 cm.  An orogastric tube was then passed into the patient's stomach in order to empty its contents.  This concluded our procedure, which the patient tolerated well.  His care was then turned over to the Anesthesia team, who awoke and extubated him without any issues.  He was transported to the  PACU in stable condition.      Staff surgeon Dr. Jody Camp was present for all portions of the procedure.      Dictated by Percy Fan MD    Resident       I was present with the resident during the entire procedure and participated in the critical aspects.    JODY CAMP MD                  D: 2017 11:38   T: 2017 06:52   MT: [email or web address]      Name:     ADAM AGUILERA   MRN:      [id number]        Account:        [id number]   :      1955           Procedure Date: 2017      Document: [id number]

## 2017-05-31 ENCOUNTER — OFFICE VISIT (OUTPATIENT)
Dept: OTOLARYNGOLOGY | Facility: CLINIC | Age: 62
End: 2017-05-31

## 2017-05-31 DIAGNOSIS — J32.1 CHRONIC FRONTAL SINUSITIS: Primary | ICD-10-CM

## 2017-05-31 DIAGNOSIS — J45.20 MILD INTERMITTENT ASTHMA WITHOUT COMPLICATION: ICD-10-CM

## 2017-05-31 RX ORDER — BUDESONIDE 0.5 MG/2ML
INHALANT ORAL
Qty: 120 ML | Refills: 11 | Status: SHIPPED | OUTPATIENT
Start: 2017-05-31

## 2017-05-31 ASSESSMENT — PAIN SCALES - GENERAL: PAINLEVEL: MILD PAIN (2)

## 2017-05-31 NOTE — PATIENT INSTRUCTIONS
Plan of care:  Budesonide irrigation twice/day using syringes, in head hanging position as instructed by Dr Mackay  Follow up with Dr Mackay as needed  Clinic contact information:  1. To schedule an appointment call [phone], option 1  2. To talk to the Triage RN call [phone], option 3  3. If you need to speak to Laine or get a message to your doctor on a Friday, call the triage RN  4. LaineRN: [phone]  5. Surgery scheduling:      Luz Elena Collins: [phone]      Esperanza Poole: [phone]  6. Fax: [phone]  7. [phone]

## 2017-05-31 NOTE — PROGRESS NOTES
HISTORY OF PRESENT ILLNESS:  Jose Antonio Dominguez returns for his first postop check from bilateral frontal sinusotomy.  He is still having headaches on the left side which is not concerning given the fact that his surgery was just over a week ago.      PHYSICAL EXAMINATION:  He is examined.      PROCEDURE:  Topical anesthetic decongestant solution is applied, and nasal endoscopy is performed.  On the right, there is minimal inflammation and I can clearly see up into the frontal recess.  I did use an olive-tip suction to suction out scant secretions on that side.  On the left, there is polypoid degeneration at the frontal recess.  I was able to pass an olive-tip suction catheter into the frontal sinus.  I suctioned out copious amounts of dried old blood from the frontal sinus, and I was able to debride a polyp from the recess.      ASSESSMENT AND PLAN:  I spent some time reassuring him that this is a typical postop course.  To prevent his polyps from returning, which he is very concerned about, I am recommending aggressive topical steroids and topical budesonide in the head hanging position to be started as soon as possible.  In the meantime, he will continue to use his Flonase twice daily.  I also offered him oral prednisone which he declined at this time.  There are no signs of infection today.

## 2017-05-31 NOTE — NURSING NOTE
Chief Complaint   Patient presents with     RECHECK     Return Post op 5/22/2017     Pt states headache of 2 today.    FERNANDO Taylor LPN

## 2017-05-31 NOTE — LETTER
5/31/2017       RE: Jose Antonio Dominguez  [address] [place] [address]     Dear Colleague,    Thank you for referring your patient, Jose Antonio Dominguez, to the [facility] E[facility] at [facility]. Please see a copy of my visit note below.    HISTORY OF PRESENT ILLNESS:  Jose Antonio Dominguez returns for his first postop check from bilateral frontal sinusotomy.  He is still having headaches on the left side which is not concerning given the fact that his surgery was just over a week ago.      PHYSICAL EXAMINATION:  He is examined.      PROCEDURE:  Topical anesthetic decongestant solution is applied, and nasal endoscopy is performed.  On the right, there is minimal inflammation and I can clearly see up into the frontal recess.  I did use an olive-tip suction to suction out scant secretions on that side.  On the left, there is polypoid degeneration at the frontal recess.  I was able to pass an olive-tip suction catheter into the frontal sinus.  I suctioned out copious amounts of dried old blood from the frontal sinus, and I was able to debride a polyp from the recess.      ASSESSMENT AND PLAN:  I spent some time reassuring him that this is a typical postop course.  To prevent his polyps from returning, which he is very concerned about, I am recommending aggressive topical steroids and topical budesonide in the head hanging position to be started as soon as possible.  In the meantime, he will continue to use his Flonase twice daily.  I also offered him oral prednisone which he declined at this time.  There are no signs of infection today.         Again, thank you for allowing me to participate in the care of your patient.      Sincerely,    Zenaida Mackay MD

## 2017-05-31 NOTE — MR AVS SNAPSHOT
After Visit Summary   2017    Jose Antonio Dominguez    MRN: [id number]           Patient Information     Date Of Birth          1955        Visit Information        Provider Department      2017 9:30 AM Zenaida Mackay MD [facility] Nose [facility] Th[facility]        Today's Diagnoses     Chronic frontal sinusitis    -  1    Mild intermittent asthma without complication          Care Instructions    Plan of care:  Budesonide irrigation twice/day using syringes, in head hanging position as instructed by Dr Mackay  Follow up with Dr Mackay as needed  Clinic contact information:  1. To schedule an appointment call [phone], option 1  2. To talk to the Triage RN call [phone], option 3  3. If you need to speak to Laine or get a message to your doctor on a Friday, call the triage RN  4. LaineRN: [phone]  5. Surgery scheduling:      Luz Elena Collins: [phone]      Esperanza Poole: [phone]  6. Fax: [phone]  7. [phone]            Follow-ups after your visit        Who to contact     Please call your clinic at [phone] to:    Ask questions about your health    Make or cancel appointments    Discuss your medicines    Learn about your test results    Speak to your doctor   If you have compliments or concerns about an experience at your clinic, or if you wish to file a complaint, please contact [facility] Patient Relations at [phone] or email us at [email or web address]         Additional Information About Your Visit        MyChart Information     [employer] is an electronic gateway that provides easy, online access to your medical records. With [employer], you can request a clinic appointment, read your test results, renew a prescription or communicate with your care team.     To sign up for [employer] visit the website at [email or web address]   You will be asked to enter the access code listed below, as well as some personal information.  Please follow the directions to create your username and password.     Your access code is: [email or web address]  Expires: 2017 11:39 AM     Your access code will  in 90 days. If you need help or a new code, please contact your [facility] or call [phone] for assistance.        Care EveryWhere ID     This is your Care EveryWhere ID. This could be used by other organizations to access your [place] medical records  [id number]         Blood Pressure from Last 3 Encounters:   17 113/71   12 111/71    Weight from Last 3 Encounters:   17 83 kg (183 lb)   17 83 kg (183 lb)   12 88.9 kg (196 lb)              We Performed the Following     NASAL ENDOSCOPY, DIAGNOSTIC          Today's Medication Changes          These changes are accurate as of: 17 11:59 PM.  If you have any questions, ask your nurse or doctor.               Start taking these medicines.        Dose/Directions    budesonide 0.5 MG/2ML neb solution   Commonly known as:  PULMICORT   Used for:  Mild intermittent asthma without complication, Chronic frontal sinusitis   Started by:  Zenaida Mackay MD        Irrigate or nebulize twice/day with 2mls for a total of 4mls/day   Quantity:  120 mL   Refills:  11            Where to get your medicines      These medications were sent to [facility] [address] [facility] [address]   [address] [place] [address]     Phone:  [phone]     budesonide 0.5 MG/2ML neb solution                Primary Care Provider Office Phone # Fax #    Orlin Adams [phone]       [facility] [address] [place] [address]        Thank you!     Thank you for choosing [facility] EAR NOSE AND THROAT  for your care. Our goal is always to provide you with excellent care. Hearing back from our patients is one way we can continue to improve our services. Please take a few  minutes to complete the written survey that you may receive in the mail after your visit with us. Thank you!             Your Updated Medication List - Protect others around you: Learn how to safely use, store and throw away your medicines at www.disposemymeds.org.          This list is accurate as of: 5/31/17 11:59 PM.  Always use your most recent med list.                   Brand Name Dispense Instructions for use    budesonide 0.5 MG/2ML neb solution    PULMICORT    120 mL    Irrigate or nebulize twice/day with 2mls for a total of 4mls/day       calcium-vitamin D 600-400 MG-UNIT per tablet    CALTRATE         carBAMazepine 100 MG 12 hr capsule    CARBATROL     Take 100 mg by mouth 3 times daily.       fluticasone 50 MCG/ACT spray    FLONASE     2 sprays       HYDROcodone-acetaminophen 5-325 MG per tablet    NORCO    30 tablet    Take 1-2 tablets by mouth every 4 hours as needed for moderate to severe pain       LYRICA PO      Take 100 mg by mouth.       SIMVASTATIN PO      Take 20 mg by mouth At Bedtime.       sodium chloride 0.65 % nasal spray    OCEAN    12 mL    Spray 2 sprays in nostril 4 times daily

## 2017-06-13 ENCOUNTER — CARE COORDINATION (OUTPATIENT)
Dept: OTOLARYNGOLOGY | Facility: CLINIC | Age: 62
End: 2017-06-13

## 2017-06-13 NOTE — ADDENDUM NOTE
Addendum  created 06/13/17 1421 by Ramila Leung APRN CRNA    Anesthesia Event edited, Procedure Event Log accessed

## 2017-06-13 NOTE — PROGRESS NOTES
Patient left message with surgery scheduler, Luz Elena, asking that she get a message to Laine or Dr Mackay that he is not doing well.   Have called patient on 6/12 at 130 and 3 pm, unable to leave message.  Called 6/12 at 230pm, unable to leave message.  If patient not doing well he needs to be seen.

## 2017-06-15 ENCOUNTER — CARE COORDINATION (OUTPATIENT)
Dept: OTOLARYNGOLOGY | Facility: CLINIC | Age: 62
End: 2017-06-15

## 2017-06-15 NOTE — PROGRESS NOTES
"Extensive discussion with patient over the phone regarding sinus drainage, pain, irrigation, drainage, pain , irrigation and more drainage, pain and irrigation.  After much discussion, patient agreed to the following.  Begin sinus irrigation (patient was instructed weeks ago to irrigate after surgery but chose not to) BID  Continue Budesonide irrigation, using upside down method with syringe    Patient \"breathes better\" but had two episodes where he drained dark bloody mucus and he finds this very upsetting because he thought he wouldn't have to deal with drainage after his surgery.  Explained if drainage returns or he develops pain, he needs to be seen.  "

## 2017-08-02 DIAGNOSIS — Z53.9 ERRONEOUS ENCOUNTER--DISREGARD: Primary | ICD-10-CM

## 2017-08-02 DIAGNOSIS — J32.4 CHRONIC PANSINUSITIS: Primary | ICD-10-CM

## 2017-08-02 RX ORDER — CLINDAMYCIN HCL 150 MG
150 CAPSULE ORAL 3 TIMES DAILY
Qty: 63 CAPSULE | Refills: 0 | Status: SHIPPED | OUTPATIENT
Start: 2017-08-02 | End: 2017-08-23

## 2017-08-09 ENCOUNTER — TELEPHONE (OUTPATIENT)
Dept: OTOLARYNGOLOGY | Facility: CLINIC | Age: 62
End: 2017-08-09

## 2017-08-09 NOTE — TELEPHONE ENCOUNTER
Patient with complaint regarding sinus fullness and upper respiratory issues.  Surgery 5/22/2017:  1.  Bilateral revision frontal sinusotomies under Stealth image guidance.   2.  Bilateral revision ethmoidectomies under Stealth image guidance.   3.  Bilateral revision sphenoidotomies under Stealth image guidance.   4.  Excision of left buccal mucosal lesion.   On Cleocin x 2 weeks.  Feels he isn't better.  Offered 8/23 at 8 AM  With Dr Mackay and held slot.  Sees Dr. Preston / local ENT next week.  Failed appointment 7/19, cancelled appointments x 2 in June.  He wants to be seen this week by Dr Mackay and that isn't possible.  He talks about changes in medical insurance, payments and needing to move and various other reasons that he has not followed up in clinic.  He feels his symptoms are not what he expected.  I asked him to come in for exam and discussion of treatments and expectations.  If he feels he is worsening this week he may call PCP or Urgent Care, or see Dr Preston.

## 2017-08-23 ENCOUNTER — OFFICE VISIT (OUTPATIENT)
Dept: OTOLARYNGOLOGY | Facility: CLINIC | Age: 62
End: 2017-08-23

## 2017-08-23 DIAGNOSIS — J32.1 CHRONIC FRONTAL SINUSITIS: Primary | ICD-10-CM

## 2017-08-23 ASSESSMENT — PAIN SCALES - GENERAL: PAINLEVEL: MODERATE PAIN (4)

## 2017-08-23 NOTE — PATIENT INSTRUCTIONS
Dr Mackay would like you to thing about the options she presented with regard how to move forward with getting you to feel better. After she talks to Dr Jarrell about the possibility of an in office procedure at his practice in [facility]. For further questions or concerns please call the RN care coordinator Laine VENEGAS at [phone].

## 2017-08-23 NOTE — NURSING NOTE
Chief Complaint   Patient presents with     RECHECK     Return F/U sinus Pt states sinus Left sided 4 today.        N Brandon LOVETTN

## 2017-08-23 NOTE — LETTER
8/23/2017       RE: Jose Antonio Dominguez  [address] [place] [id number]     Dear Colleague,    Thank you for referring your patient, Jose Antonio Dominguez, to the [facility] EAR NOSE AND THROAT at [facility]. Please see a copy of my visit note below.    HISTORY OF PRESENT ILLNESS:  Jose Antonio Dominguez is a patient who I operated on for chronic and recurrent frontal sinusitis.  Unfortunately, after surgery his headaches relieved and he felt like his sinus was still  blocked.  I saw him at his first postoperative visit. I was able to pass a suction catheter into his frontal sinus easily and suctioned out copious, thick old blood and mucus.  He had several additional episodes of drainage like that, but unfortunately now his sinus stopped draining and he feels like it is plugged.  He did see Dr. Preston last week who feels that the frontal recess is scarred over.  He is still having some headaches.  He has been on and off of antibiotics since the surgery.      PHYSICAL EXAMINATION:  He is examined today.      PROCEDURE:  Topical anesthetic decongestant solution is applied, and nasal endoscopy is performed.  On the left side, I attempted to pass an olive-tip suction catheter.  This would not pass passed some scar tissue.  I then attempted to pass a curved Calgiswab into his frontal sinus.  Unfortunately, there is a small polyp at the recess opening and there is some scar tissue and I cannot pass a Calgiswab into the sinus.   Unfortunately, he has once again scarred the frontal recess and he is very frustrated with this.      ASSESSMENT AND PLAN:  My assessment is that he has ongoing likely chronic frontal sinusitis.  I would like to obtain an imaging study, but we will hold off on that for now while Jose Antonio thinks about what he would like to do next.  I have offered revision endoscopic sinus surgery.  I also would be totally fine if he would like to meet with someone else to consider a repeat  procedure.  I did let him know that one of my colleagues has in office image-guided technology and could potentially attempt a balloon dilatation in the office and if he is interested in that, we would have him meet with Dr. Erich Jarrell.  Additionally, if he does undergo any repeat procedures, I think that placing a Diamond frontal sinus stent may be a good option given the fact that despite Dr. Preston's previous surgery and my surgery, he has scarred the frontal recess off on the left and postoperative healing time frame.  Jose Antonio is understandably frustrated and I apologized to him that we were not able to fix his troubles the first time around and we will let him decide what he would like to do next.      HB/ms         Again, thank you for allowing me to participate in the care of your patient.      Sincerely,    Zenaida Mackay MD

## 2017-08-23 NOTE — MR AVS SNAPSHOT
After Visit Summary   2017    Jose Antonio Dominguez    MRN: [id number]           Patient Information     Date Of Birth          1955        Visit Information        Provider Department      2017 8:00 AM Zenaida Mackay MD [facility] Ear Nose [facility] Th[facility]at        Today's Diagnoses     Chronic frontal sinusitis    -  1      Care Instructions    Dr Mackay would like you to thing about the options she presented with regard how to move forward with getting you to feel better. After she talks to Dr Jarrell about the possibility of an in office procedure at his practice in [facility]. For further questions or concerns please call the RN care coordinator Laine VENEGAS at [phone].          Follow-ups after your visit        Who to contact     Please call your clinic at [phone] to:    Ask questions about your health    Make or cancel appointments    Discuss your medicines    Learn about your test results    Speak to your doctor   If you have compliments or concerns about an experience at your clinic, or if you wish to file a complaint, please contact [facility] Patient Relations at [phone] or email us at [email or web address]         Additional Information About Your Visit        MyChart Information     [employer] is an electronic gateway that provides easy, online access to your medical records. With [employer], you can request a clinic appointment, read your test results, renew a prescription or communicate with your care team.     To sign up for [employer] visit the website at [email or web address]   You will be asked to enter the access code listed below, as well as some personal information. Please follow the directions to create your username and password.     Your access code is: [email or web address]  Expires: 2017  9:34 AM     Your access code will  in 90 days. If you need help or a new code, please contact your [facility] or  call [phone] for assistance.        Care EveryWhere ID     This is your Care EveryWhere ID. This could be used by other organizations to access your [place] medical records  [id number]         Blood Pressure from Last 3 Encounters:   05/22/17 113/71   07/18/12 111/71    Weight from Last 3 Encounters:   05/22/17 83 kg (183 lb)   03/09/17 83 kg (183 lb)   07/18/12 88.9 kg (196 lb)              We Performed the Following     NASAL ENDOSCOPY, DIAGNOSTIC        Primary Care Provider Office Phone # Fax #    Orlin Adams [phone]       [facility] [address] [place] [address]        Equal Access to Services     MONCHO WINTERS : Hadii aad ku hadasho Soomaali, waaxda luqadaha, qaybta kaalmada adeegyada, waxay idiin hayluis migueln edyta duque . So [facility] [phone].    ATENCIÓN: Si habla español, tiene a iln disposición servicios gratuitos de asistencia lingüística. [facility] [phone].    We comply with applicable federal civil rights laws and [place] laws. We do not discriminate on the basis of race, color, national origin, age, disability sex, sexual orientation or gender identity.            Thank you!     Thank you for choosing [facility] EAR NOSE [facility] TH[facility]AT  for your care. Our goal is always to provide you with excellent care. Hearing back from our patients is one way we can continue to improve our services. Please take a few minutes to complete the written survey that you may receive in the mail after your visit with us. Thank you!             Your Updated Medication List - Protect others around you: Learn how to safely use, store and throw away your medicines at www.disposemymeds.org.          This list is accurate as of: 8/23/17 11:59 PM.  Always use your most recent med list.                   Brand Name Dispense Instructions for use Diagnosis    budesonide 0.5 MG/2ML neb solution    PULMICORT    120 mL    Irrigate or nebulize twice/day with 2mls for a  total of 4mls/day    Mild intermittent asthma without complication, Chronic frontal sinusitis       calcium-vitamin D 600-400 MG-UNIT per tablet    CALTRATE          carBAMazepine 100 MG 12 hr capsule    CARBATROL     Take 100 mg by mouth 3 times daily.        clindamycin 150 MG capsule    CLEOCIN    63 capsule    Take 1 capsule (150 mg) by mouth 3 times daily for 21 days    Chronic pansinusitis       DOXYCYCLINE HYCLATE PO           fluticasone 50 MCG/ACT spray    FLONASE     2 sprays        HYDROcodone-acetaminophen 7.5-500 MG per tablet    LORTAB          LYRICA PO      Take 100 mg by mouth.        SIMVASTATIN PO      Take 20 mg by mouth At Bedtime.

## 2017-08-23 NOTE — PROGRESS NOTES
HISTORY OF PRESENT ILLNESS:  Jose Antonio Dominguez is a patient who I operated on for chronic and recurrent frontal sinusitis.  Unfortunately, after surgery his headaches relieved and he felt like his sinus was still  blocked.  I saw him at his first postoperative visit. I was able to pass a suction catheter into his frontal sinus easily and suctioned out copious, thick old blood and mucus.  He had several additional episodes of drainage like that, but unfortunately now his sinus stopped draining and he feels like it is plugged.  He did see Dr. Preston last week who feels that the frontal recess is scarred over.  He is still having some headaches.  He has been on and off of antibiotics since the surgery.      PHYSICAL EXAMINATION:  He is examined:  Normal affect, general features normal, no acute distress.  In order to further examine the nose, endoscopy is indicated to rule out infection or sinonasal pathology.     PROCEDURE:  Topical anesthetic decongestant solution is applied, and nasal endoscopy is performed.  On the left side, I attempted to pass an olive-tip suction catheter.  This would not pass passed some scar tissue.  I then attempted to pass a curved [place] into his frontal sinus.  Unfortunately, there is a small polyp at the recess opening and there is some scar tissue and I cannot pass a [place]wab into the sinus.   Unfortunately, he has once again scarred the frontal recess and he is very frustrated with this.      ASSESSMENT AND PLAN:  My assessment is that he has ongoing likely chronic frontal sinusitis.  I would like to obtain an imaging study, but we will hold off on that for now while Jose Antonio thinks about what he would like to do next.  I have offered revision endoscopic sinus surgery.  I also would be totally fine if he would like to meet with someone else to consider a repeat procedure.  I did let him know that one of my colleagues has in office image-guided technology and could potentially attempt a balloon  dilatation in the office and if he is interested in that, we would have him meet with Dr. Erich Jarrell.  Additionally, if he does undergo any repeat procedures, I think that placing a Diamond frontal sinus stent may be a good option given the fact that despite Dr. Preston's previous surgery and my surgery, he has scarred the frontal recess off on the left and postoperative healing time frame.  Jose Antonio is understandably frustrated and I apologized to him that we were not able to fix his troubles the first time around and we will let him decide what he would like to do next.      HB/ms

## 2024-04-05 ENCOUNTER — LAB REQUISITION (OUTPATIENT)
Dept: LAB | Facility: CLINIC | Age: 69
End: 2024-04-05
Payer: COMMERCIAL

## 2024-04-05 DIAGNOSIS — J32.4 CHRONIC PANSINUSITIS: ICD-10-CM

## 2024-04-05 PROCEDURE — 87070 CULTURE OTHR SPECIMN AEROBIC: CPT | Mod: ORL | Performed by: OTOLARYNGOLOGY

## 2024-04-07 LAB — BACTERIA SPEC CULT: NORMAL

## (undated) DEVICE — NDL COUNTER 20CT 31142493

## (undated) DEVICE — DRAPE U SPLIT 74X120" 29440

## (undated) DEVICE — SYR 30ML LL W/O NDL 302832

## (undated) DEVICE — BASIN SET SINGLE STERILE 13752-624

## (undated) DEVICE — LINEN TOWEL PACK X5 5464

## (undated) DEVICE — ESU GROUND PAD ADULT W/CORD E7507

## (undated) DEVICE — BLADE SINUS TRICUT STR 4MMX13CM FUSION ROTATE W/TRACKING

## (undated) DEVICE — GELFILM 12.5 SQ SM 1X2"

## (undated) DEVICE — SU CHROMIC 4-0 RB-1 27" U203H

## (undated) DEVICE — DRAPE WARMER 66X44" ORS-300

## (undated) DEVICE — SUCTION MANIFOLD NEPTUNE 2 SYS 1 PORT 702-025-000

## (undated) DEVICE — SPONGE COTTONOID 1/2X3" 80-1407

## (undated) DEVICE — SOL NACL 0.9% INJ 1000ML BAG 2B1324X

## (undated) DEVICE — SYR 03ML LL W/O NDL 309657

## (undated) DEVICE — SOL NACL 0.9% IRRIG 1000ML BOTTLE 2F7124

## (undated) DEVICE — PACK ENT ENDOSCOPY CUSTOM ASC

## (undated) DEVICE — BUR TAPER DIAMOND 4MM 70DEG 1883672HS

## (undated) DEVICE — WIPE INSTRUMENT MEROCEL 400200

## (undated) DEVICE — DRSG HOLDER NASAL DALE 600

## (undated) DEVICE — NDL 25GA 2"  8881200441

## (undated) DEVICE — SU VICRYL 4-0 RB-1 27" J304

## (undated) DEVICE — TRACKER ENT OTS INSTRUMENT FUSION 9733533

## (undated) DEVICE — TRACKER ENT OTS PATIENT FUSION 9733534

## (undated) DEVICE — TUBING IRRIGATOR STRAIGHTSHOT XPS 1895522

## (undated) DEVICE — BLADE SHAVER SINUS 3.5MM Q RAD 90DEG CVD 1883519HR

## (undated) DEVICE — SPONGE RAY-TEC 4X8" 7318

## (undated) DEVICE — GLOVE PROTEXIS POWDER FREE SMT 6.5  2D72PT65X

## (undated) DEVICE — TUBING SUCTION 12"X1/4" N612

## (undated) DEVICE — SOL WATER IRRIG 1000ML BOTTLE 2F7114

## (undated) DEVICE — SPECIMEN TRAP STRYKER NEPTUNE IN-LINE 0700-050-000

## (undated) RX ORDER — OXYMETAZOLINE HYDROCHLORIDE 0.05 G/100ML
SPRAY NASAL
Status: DISPENSED
Start: 2017-05-22

## (undated) RX ORDER — ONDANSETRON 2 MG/ML
INJECTION INTRAMUSCULAR; INTRAVENOUS
Status: DISPENSED
Start: 2017-05-22

## (undated) RX ORDER — EPHEDRINE SULFATE 50 MG/ML
INJECTION, SOLUTION INTRAMUSCULAR; INTRAVENOUS; SUBCUTANEOUS
Status: DISPENSED
Start: 2017-05-22

## (undated) RX ORDER — GLYCOPYRROLATE 0.2 MG/ML
INJECTION INTRAMUSCULAR; INTRAVENOUS
Status: DISPENSED
Start: 2017-05-22

## (undated) RX ORDER — FENTANYL CITRATE 50 UG/ML
INJECTION, SOLUTION INTRAMUSCULAR; INTRAVENOUS
Status: DISPENSED
Start: 2017-05-22

## (undated) RX ORDER — LIDOCAINE HYDROCHLORIDE AND EPINEPHRINE 10; 10 MG/ML; UG/ML
INJECTION, SOLUTION INFILTRATION; PERINEURAL
Status: DISPENSED
Start: 2017-05-22

## (undated) RX ORDER — DEXAMETHASONE SODIUM PHOSPHATE 4 MG/ML
INJECTION, SOLUTION INTRA-ARTICULAR; INTRALESIONAL; INTRAMUSCULAR; INTRAVENOUS; SOFT TISSUE
Status: DISPENSED
Start: 2017-05-22

## (undated) RX ORDER — PROPOFOL 10 MG/ML
INJECTION, EMULSION INTRAVENOUS
Status: DISPENSED
Start: 2017-05-22

## (undated) RX ORDER — ACETAMINOPHEN 325 MG/1
TABLET ORAL
Status: DISPENSED
Start: 2017-05-22

## (undated) RX ORDER — GABAPENTIN 300 MG/1
CAPSULE ORAL
Status: DISPENSED
Start: 2017-05-22